# Patient Record
Sex: FEMALE | Race: WHITE
[De-identification: names, ages, dates, MRNs, and addresses within clinical notes are randomized per-mention and may not be internally consistent; named-entity substitution may affect disease eponyms.]

---

## 2017-01-03 ENCOUNTER — HOSPITAL ENCOUNTER (OUTPATIENT)
Dept: HOSPITAL 10 - OBT | Age: 22
LOS: 1 days | Discharge: HOME | End: 2017-01-04
Attending: OBSTETRICS & GYNECOLOGY
Payer: COMMERCIAL

## 2017-01-03 VITALS
WEIGHT: 199.52 LBS | BODY MASS INDEX: 33.24 KG/M2 | HEIGHT: 65 IN | WEIGHT: 199.52 LBS | HEIGHT: 65 IN | BODY MASS INDEX: 33.24 KG/M2

## 2017-01-03 VITALS — HEART RATE: 94 BPM | DIASTOLIC BLOOD PRESSURE: 68 MMHG | SYSTOLIC BLOOD PRESSURE: 121 MMHG | RESPIRATION RATE: 18 BRPM
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
## 2017-01-03 DIAGNOSIS — O9A.212: Primary | ICD-10-CM

## 2017-01-03 DIAGNOSIS — R10.9: ICD-10-CM

## 2017-01-03 DIAGNOSIS — Z3A.23: ICD-10-CM

## 2017-01-03 LAB
ALBUMIN SERPL-MCNC: 3.2 G/DL (ref 3.3–4.9)
ALBUMIN/GLOB SERPL: 1.1 {RATIO}
ALP SERPL-CCNC: 97 IU/L (ref 42–121)
ALT SERPL-CCNC: 27 IU/L (ref 13–69)
AMYLASE SERPL-CCNC: 60 U/L (ref 11–123)
ANION GAP SERPL CALC-SCNC: 14 MMOL/L (ref 8–16)
AST SERPL-CCNC: 15 IU/L (ref 15–46)
BARBITURATES UR-MCNC: NEGATIVE UG/ML
BENZODIAZ UR-MCNC: NEGATIVE UG/L
BILIRUB DIRECT SERPL-MCNC: 0 MG/DL (ref 0–0.2)
BILIRUB SERPL-MCNC: 0.1 MG/DL (ref 0.2–1.3)
BUN SERPL-MCNC: 9 MG/DL (ref 7–20)
CALCIUM SERPL-MCNC: 8.7 MG/DL (ref 8.4–10.2)
CANNABINOIDS UR-MCNC: NEGATIVE UG/L
CHLORIDE SERPL-SCNC: 105 MMOL/L (ref 97–110)
CO2 SERPL-SCNC: 22 MMOL/L (ref 21–31)
COCAINE UR-MCNC: NEGATIVE NG/ML
CREAT SERPL-MCNC: 0.47 MG/DL (ref 0.44–1)
GLOBULIN SER-MCNC: 2.9 G/DL (ref 1.3–3.2)
GLUCOSE SERPL-MCNC: 74 MG/DL (ref 70–220)
OPIATES UR-MCNC: NEGATIVE NG/ML
POTASSIUM SERPL-SCNC: 3.7 MMOL/L (ref 3.5–5.1)
PROT SERPL-MCNC: 6.1 G/DL (ref 6.1–8.1)
SODIUM SERPL-SCNC: 137 MMOL/L (ref 135–144)

## 2017-01-03 PROCEDURE — 80303: CPT

## 2017-01-03 PROCEDURE — 82150 ASSAY OF AMYLASE: CPT

## 2017-01-03 PROCEDURE — 85460 HEMOGLOBIN FETAL: CPT

## 2017-01-03 PROCEDURE — 80307 DRUG TEST PRSMV CHEM ANLYZR: CPT

## 2017-01-03 PROCEDURE — 86900 BLOOD TYPING SEROLOGIC ABO: CPT

## 2017-01-03 PROCEDURE — 86901 BLOOD TYPING SEROLOGIC RH(D): CPT

## 2017-01-03 PROCEDURE — 83690 ASSAY OF LIPASE: CPT

## 2017-01-03 PROCEDURE — 86850 RBC ANTIBODY SCREEN: CPT

## 2017-01-03 PROCEDURE — 80053 COMPREHEN METABOLIC PANEL: CPT

## 2017-01-03 PROCEDURE — 76815 OB US LIMITED FETUS(S): CPT

## 2017-01-03 PROCEDURE — 76705 ECHO EXAM OF ABDOMEN: CPT

## 2017-01-03 PROCEDURE — G0463 HOSPITAL OUTPT CLINIC VISIT: HCPCS

## 2017-01-03 NOTE — RADRPT
PROCEDURE:   US Abdomen (right upper quadrant). 

 

CLINICAL INDICATION:   Right upper quadrant abdomen pain. Trauma due to a fall.  

 

TECHNIQUE:   Multiple real-time longitudinal and transverse images of the right upper quadrant of th
e abdomen were acquired utilizing a curved array transducer. Images were reviewed on a high-resoluti
on PACS workstation. 

 

COMPARISON:   None 

 

FINDINGS:

The liver is normal in size and echogenicity.

There is no focal hepatic lesion.   Color Doppler and pulsed Doppler sonography demonstrate normal a
ntegrade flow in the portal vein. 

The gallbladder is normal with no stones or wall thickening.  There is no pericholecystic fluid jessica
ection.

The bile ducts are normal with the common bile duct measuring 4.6 mm in diameter.  

The visualized portions of the pancreas are unremarkable with obscuration of the tail of the pancrea
s.  

No free fluid is present.  

The right kidney measures 9.4 cm.  There is normal  echogenicity of the right kidney.  There is no p
erinephric fluid collection.  No hydronephrosis, mass, or calculus is seen.   

 

IMPRESSION:

1.  Unremarkable right upper quadrant abdomen ultrasound.  

 

RPTAT: QQ

_____________________________________________ 

.Dong Christianson MD, MD           Date    Time 

Electronically viewed and signed by .Dong Christianson MD, MD on 01/03/2017 23:00 

 

D:  01/03/2017 23:00  T:  01/03/2017 23:00

.R/

## 2017-01-03 NOTE — RADRPT
PROCEDURE:   US OB. 

 

CLINICAL INDICATION:   Trauma due to a fall.  Pregnant.  Pelvic pain. 

 

TECHNIQUE:   Multiple sonographic images of the pregnant uterus were obtained.  The images were revi
ewed on a PACS workstation. 

 

COMPARISON:   No prior studies are available for comparison. 

 

FINDINGS:

There is a single live intrauterine gestation.  Fetal heart rate is 141 beats per minute. 

Measurements were made in order to determine fetal age. The results are as follows:

BPD = 5.72 cm.

HC = 19.55 cm.

AC = 19.17 cm.

FL = 3.91 cm.

Estimated fetal weight is 570 +/- 86 grams.  

LMP growth percentile is 44 %.    

The cephalic index is elevated measuring 89.09 (normal is 70.00-86.00).

The FL/BPD ratio is diminished measuring 68.40 (normal is 71.0-87.0).

The HC/AC ratio is diminished measuring 1.02 (normal is 1.05-1.21).

Menstrual age by ultrasound dates is 23 weeks 0 days.  

The estimated date of delivery is 05/02/2017.  

Position is cephalic and placenta is posterior fundal grade 1. There is no evidence for an abruption
 or placenta previa. Maximum vertical pocket of amniotic fluid is 7.2 cm.

 

IMPRESSION:

1.  Single live intrauterine gestation of 23 weeks 0 days menstrual age by ultrasound dates.  

2.  The estimated date of delivery is 05/02/2017. 

3.  Normal posterior fundal placenta with no abruption or previa.

3.  Abnormal biometric ratios as indicated above.  Follow-up is advised to ensure adequate growth.

 

RPTAT: QQ

_____________________________________________ 

.Dong Christianson MD, MD           Date    Time 

Electronically viewed and signed by .Dong Christianson MD, MD on 01/03/2017 23:03 

 

D:  01/03/2017 23:03  T:  01/03/2017 23:03

.R/

## 2017-10-12 ENCOUNTER — HOSPITAL ENCOUNTER (OUTPATIENT)
Dept: HOSPITAL 10 - OBT | Age: 22
Discharge: HOME | End: 2017-10-12
Attending: SPECIALIST
Payer: COMMERCIAL

## 2017-10-12 VITALS — SYSTOLIC BLOOD PRESSURE: 129 MMHG | DIASTOLIC BLOOD PRESSURE: 68 MMHG | RESPIRATION RATE: 18 BRPM | HEART RATE: 80 BPM

## 2017-10-12 VITALS
HEIGHT: 66 IN | HEIGHT: 66 IN | WEIGHT: 180.38 LBS | BODY MASS INDEX: 28.99 KG/M2 | WEIGHT: 180.38 LBS | BODY MASS INDEX: 28.99 KG/M2

## 2017-10-12 DIAGNOSIS — Z3A.26: ICD-10-CM

## 2017-10-12 DIAGNOSIS — O46.8X2: Primary | ICD-10-CM

## 2017-10-12 PROCEDURE — G0463 HOSPITAL OUTPT CLINIC VISIT: HCPCS

## 2017-10-12 PROCEDURE — 84703 CHORIONIC GONADOTROPIN ASSAY: CPT

## 2017-10-12 PROCEDURE — 76815 OB US LIMITED FETUS(S): CPT

## 2017-10-12 PROCEDURE — 84702 CHORIONIC GONADOTROPIN TEST: CPT

## 2017-10-12 NOTE — PN
Triage Information


Date/Time


10/2235


Reason for visit:  Vag spotting / bleeding


Weeks of Gestation


supposely  26w2d


passing clots yesterday  with brown discharge


/Para


 


s/p  6mo ago


Diabetes:  none


Hypertention:  none





Objective





 Vital Signs








  Date Time  Temp Pulse Resp B/P Pulse Ox O2 Delivery O2 Flow Rate FiO2


 


10/12/17 18:56 98.6 80 18 129/68    








Fetal Heart Rate Comments


no FHT





Results/Medications


Results 24 hrs





Laboratory Tests








Test


  10/12/17


20:00


 


Serum HCG, Qualitative POSITIVE  


 


Beta HCG, Quantitative 126.9  








Imaging Results


no IUP


ectopic pregnancy  not  excluded  if  pregnancy test positive


Assessment/Plan


serum HCG quant   126.9





A  poss  SAB  with very early IUP


     cannot exclude  ectopic pregnancy  


P  f/u  serum HCG quant  3days


    ectopic warning  sg given


    RTH immediately  PRN for pelvic pain











GAUTAM CHRIS MD Oct 12, 2017 22:44

## 2017-10-12 NOTE — RADRPT
PROCEDURE:   OB Ultrasound. 

 

CLINICAL INDICATION:   Positive pregnancy test.  No prenatal record.

 

TECHNIQUE:   Ultrasound of the pelvis was performed with transabdominal sonography in the axial and 
sagittal planes. 

 

COMPARISON:   No prior study is available for comparison. 

 

FINDINGS:

There is no intrauterine gestational sac. The uterus measures 9.7 x 5.4 x 6.9 cm. Endometrial thickn
ess is 12.9 mm. There is no uterine enlargement or mass.

The right ovary measures 3.2 x 3.6 x 2.4 cm.  The left ovary measures 2.9 x 2.0 x 2.3 cm.

Color Doppler and pulsed Doppler sonography demonstrate normal flow to both ovaries.  

There is no ovarian enlargement or mass.

There is no other pelvic mass or free fluid.   

 

IMPRESSION:

1.  No intrauterine gestational sac. If the patient has a positive pregnancy test, ectopic gestation
 cannot be excluded.

2.  Otherwise unremarkable study.

 

RPTAT: QQ

_____________________________________________ 

.Dong Christianson MD, MD           Date    Time 

Electronically viewed and signed by .Dong Christianson MD, MD on 10/12/2017 19:36 

 

D:  10/12/2017 19:36  T:  10/12/2017 19:36

.R/

## 2017-10-12 NOTE — TRIAGE
===================================

OB Triage

===================================

Datetime Report Generated by CPN: 10/12/2017 20:46

   

   

===========================

Datetime: 10/12/2017 20:00

===========================

   

 Stage of Pregnancy:  OB Triage

   

===========================

Datetime: 10/12/2017 19:50

===========================

   

 Stage of Pregnancy:  OB Triage

   

===========================

Datetime: 10/12/2017 18:59

===========================

   

 Stage of Pregnancy:  OB Triage

 Assessment Type:  Triage

   

===================================

Maternal Assessment

===================================

   

 Level of Consciousness:  Fully Conscious

 DTR's/Clonus:  DTRs 2+; No Clonus

 Headache:  Denies

 Blurred Vision:  No

 Respiratory Effort:  Unlabored; Regular Rhythm; Equal Expansion

 Breath Sounds, Left:  Clear and Equal

 Breath Sounds, Right:  Clear and Equal

 Nausea/Vomiting:  Denies

 RUQ Epigastric Pain:  Denies

 Facial Edema:  None

 Temperature Route:  Axillary

   

===================================

Fall Risk Assessment

===================================

   

 History of Falling:  (0) No

 Secondary Diagnosis:  (0) No

 Ambulatory Aid:  (0) Bedrest/Nurse Assist

 IV Therapy:  (0) No

 Gait:  (0) Normal/Bedrest/Immobile

 Mental Status:  (0) Oriented to Own Ability

 Fall Score:  0

 Fall Risk Score Definition:  No Risk: No action required

   

===================================

Labor Evaluation

===================================

   

 Frequency:  0

 Monitor Mode:  External

 Resting Tone [place]ick[place]:  Relaxed

   

===================================

Fetal Heart Rate

===================================

   

 FHR Baseline Rate:  unable to find fht

 Monitor Mode:  Doppler

   

===================================

Pain Assessment

===================================

   

 Pain Scale:  0

 Pain Presence:  None/Denies

 Pain Type:  N/A

 Pain Goal:  3

 Pain Relief Measures:  Comfort Measures

   

===========================

Datetime: 10/12/2017 18:46

===========================

   

 Stage of Pregnancy:  OB Triage

   

===========================

Datetime: 10/12/2017 18:38

===========================

   

 Comments:  ATTEMPTED TO FIND FHT VIA  FM AS WELL AS DOPPLER.   U/S ORDERED

   

===========================

Datetime: 10/12/2017 18:36

===========================

   

 Time of Arrival:  10/12/2017 17:30

 Arrived By:  Ambulatory

 Arrived From:  Home

 Chief Complaint:  c/O BLEEDING X 2 DAYS, DENIES LEAKING, BACK PAIN THAT IS CONSTANT

 Fetal Movement:  Absent

 Contractions:  Denies/Absent

 Rupture of Membranes:  Denies

 Vaginal Bleeding:  Moderate

 Vaginal Discharge:  Denies

 Recent Sexual Intercouse:  Denies

 Abdominal Trauma:  Not Applicable

 Patient Complaints:  None

 Additional Patient Complaints:  HAD MOD BLEEDING YESTERDAY,  brown discharge today

 Time Provider Notified:  10/12/2017 18:36

 Provider Notified:  suzy

 Initial Plan:  MONITOR, efw

## 2018-08-29 ENCOUNTER — HOSPITAL ENCOUNTER (EMERGENCY)
Age: 23
Discharge: HOME | End: 2018-08-29

## 2018-08-29 ENCOUNTER — HOSPITAL ENCOUNTER (EMERGENCY)
Dept: HOSPITAL 91 - FTE | Age: 23
Discharge: HOME | End: 2018-08-29
Payer: COMMERCIAL

## 2018-08-29 DIAGNOSIS — Z3A.01: ICD-10-CM

## 2018-08-29 DIAGNOSIS — O23.11: Primary | ICD-10-CM

## 2018-08-29 LAB
ADD MAN DIFF?: NO
ADD UMIC: YES
BASOPHIL #: 0 10^3/UL (ref 0–0.1)
BASOPHILS %: 0.3 % (ref 0–2)
EOSINOPHILS #: 0.1 10^3/UL (ref 0–0.5)
EOSINOPHILS %: 0.5 % (ref 0–7)
HEMATOCRIT: 39.4 % (ref 37–47)
HEMOGLOBIN: 12.8 G/DL (ref 12–16)
IMMATURE GRANS #M: 0.06 10^3/UL (ref 0–0.03)
IMMATURE GRANS % (M): 0.5 % (ref 0–0.43)
LYMPHOCYTES #: 2.3 10^3/UL (ref 0.8–2.9)
LYMPHOCYTES %: 19.3 % (ref 15–51)
MEAN CORPUSCULAR HEMOGLOBIN: 28.3 PG (ref 29–33)
MEAN CORPUSCULAR HGB CONC: 32.5 G/DL (ref 32–37)
MEAN CORPUSCULAR VOLUME: 87.2 FL (ref 82–101)
MEAN PLATELET VOLUME: 9.8 FL (ref 7.4–10.4)
MONOCYTE #: 0.8 10^3/UL (ref 0.3–0.9)
MONOCYTES %: 7 % (ref 0–11)
NEUTROPHIL #: 8.5 10^3/UL (ref 1.6–7.5)
NEUTROPHILS %: 72.4 % (ref 39–77)
NUCLEATED RED BLOOD CELLS #: 0 10^3/UL (ref 0–0)
NUCLEATED RED BLOOD CELLS%: 0 /100WBC (ref 0–0)
PLATELET COUNT: 335 10^3/UL (ref 140–415)
RED BLOOD COUNT: 4.52 10^6/UL (ref 4.2–5.4)
RED CELL DISTRIBUTION WIDTH: 13.2 % (ref 11.5–14.5)
UR ASCORBIC ACID: NEGATIVE MG/DL
UR BILIRUBIN (DIP): NEGATIVE MG/DL
UR BLOOD (DIP): NEGATIVE MG/DL
UR CLARITY: (no result)
UR COLOR: YELLOW
UR GLUCOSE (DIP): NEGATIVE MG/DL
UR KETONES (DIP): (no result) MG/DL
UR LEUKOCYTE ESTERASE (DIP): (no result) LEU/UL
UR MUCUS: (no result) /HPF
UR NITRITE (DIP): NEGATIVE MG/DL
UR PH (DIP): 6 (ref 5–9)
UR RBC: 2 /HPF (ref 0–5)
UR SPECIFIC GRAVITY (DIP): 1.03 (ref 1–1.03)
UR SQUAMOUS EPITHELIAL CELL: (no result) /HPF
UR TOTAL PROTEIN (DIP): NEGATIVE MG/DL
UR UROBILINOGEN (DIP): NEGATIVE MG/DL
UR WBC: 1 /HPF (ref 0–5)
WHITE BLOOD COUNT: 11.8 10^3/UL (ref 4.8–10.8)

## 2018-08-29 PROCEDURE — 86900 BLOOD TYPING SEROLOGIC ABO: CPT

## 2018-08-29 PROCEDURE — 85025 COMPLETE CBC W/AUTO DIFF WBC: CPT

## 2018-08-29 PROCEDURE — 76801 OB US < 14 WKS SINGLE FETUS: CPT

## 2018-08-29 PROCEDURE — 86901 BLOOD TYPING SEROLOGIC RH(D): CPT

## 2018-08-29 PROCEDURE — 99284 EMERGENCY DEPT VISIT MOD MDM: CPT

## 2018-08-29 PROCEDURE — 81001 URINALYSIS AUTO W/SCOPE: CPT

## 2018-08-29 PROCEDURE — 36415 COLL VENOUS BLD VENIPUNCTURE: CPT

## 2018-08-29 PROCEDURE — 84702 CHORIONIC GONADOTROPIN TEST: CPT

## 2018-08-29 RX ADMIN — ACETAMINOPHEN 1 MG: 325 TABLET, FILM COATED ORAL at 18:35

## 2018-08-29 RX ADMIN — ONDANSETRON 1 MG: 4 TABLET, ORALLY DISINTEGRATING ORAL at 18:34
